# Patient Record
Sex: MALE | Race: WHITE | Employment: OTHER | URBAN - METROPOLITAN AREA
[De-identification: names, ages, dates, MRNs, and addresses within clinical notes are randomized per-mention and may not be internally consistent; named-entity substitution may affect disease eponyms.]

---

## 2017-02-27 PROCEDURE — 84403 ASSAY OF TOTAL TESTOSTERONE: CPT | Performed by: INTERNAL MEDICINE

## 2017-03-29 PROBLEM — E11.65 TYPE 2 DIABETES MELLITUS WITH HYPERGLYCEMIA, WITHOUT LONG-TERM CURRENT USE OF INSULIN (HCC): Status: ACTIVE | Noted: 2017-03-29

## 2017-03-29 PROBLEM — E11.3393 MODERATE NONPROLIFERATIVE DIABETIC RETINOPATHY OF BOTH EYES WITHOUT MACULAR EDEMA ASSOCIATED WITH TYPE 2 DIABETES MELLITUS (HCC): Status: ACTIVE | Noted: 2017-03-29

## 2017-07-25 PROBLEM — K21.9 CHRONIC GERD: Status: ACTIVE | Noted: 2017-07-25

## 2017-08-25 PROBLEM — H81.11 BPPV (BENIGN PAROXYSMAL POSITIONAL VERTIGO), RIGHT: Status: ACTIVE | Noted: 2017-08-25

## 2017-08-31 PROBLEM — H81.21 VESTIBULAR NEURONITIS OF RIGHT EAR: Status: ACTIVE | Noted: 2017-08-31

## 2018-01-25 PROCEDURE — 82043 UR ALBUMIN QUANTITATIVE: CPT | Performed by: INTERNAL MEDICINE

## 2018-01-25 PROCEDURE — 82570 ASSAY OF URINE CREATININE: CPT | Performed by: INTERNAL MEDICINE

## 2018-01-25 PROCEDURE — 36415 COLL VENOUS BLD VENIPUNCTURE: CPT | Performed by: INTERNAL MEDICINE

## 2018-05-10 PROCEDURE — 84146 ASSAY OF PROLACTIN: CPT | Performed by: INTERNAL MEDICINE

## 2018-05-10 PROCEDURE — 36415 COLL VENOUS BLD VENIPUNCTURE: CPT | Performed by: INTERNAL MEDICINE

## 2018-05-10 PROCEDURE — 83001 ASSAY OF GONADOTROPIN (FSH): CPT | Performed by: INTERNAL MEDICINE

## 2018-05-10 PROCEDURE — 84402 ASSAY OF FREE TESTOSTERONE: CPT | Performed by: INTERNAL MEDICINE

## 2018-05-10 PROCEDURE — 83002 ASSAY OF GONADOTROPIN (LH): CPT | Performed by: INTERNAL MEDICINE

## 2018-05-10 PROCEDURE — 84403 ASSAY OF TOTAL TESTOSTERONE: CPT | Performed by: INTERNAL MEDICINE

## 2018-05-16 PROCEDURE — 88305 TISSUE EXAM BY PATHOLOGIST: CPT | Performed by: INTERNAL MEDICINE

## 2019-06-10 PROCEDURE — 88305 TISSUE EXAM BY PATHOLOGIST: CPT | Performed by: INTERNAL MEDICINE

## 2019-11-07 PROBLEM — H40.51X3 NEOVASCULAR GLAUCOMA OF RIGHT EYE, SEVERE STAGE: Status: ACTIVE | Noted: 2019-11-07

## 2019-11-07 PROBLEM — H34.8111: Status: ACTIVE | Noted: 2019-11-07

## 2019-11-07 PROBLEM — E11.9 DIABETES MELLITUS TYPE 2 WITHOUT RETINOPATHY (HCC): Status: ACTIVE | Noted: 2019-11-07

## 2019-11-07 PROBLEM — Z96.1 PSEUDOPHAKIA: Status: ACTIVE | Noted: 2019-11-07

## 2019-11-07 PROBLEM — H25.12 NUCLEAR SCLEROTIC CATARACT OF LEFT EYE: Status: ACTIVE | Noted: 2019-11-07

## 2019-11-07 PROBLEM — H43.11 VITREOUS HEMORRHAGE OF RIGHT EYE (HCC): Status: ACTIVE | Noted: 2019-11-07

## 2019-11-07 PROBLEM — H44.521: Status: ACTIVE | Noted: 2019-11-07

## 2019-11-07 PROBLEM — H34.8111 CENTRAL RETINAL VEIN OCCLUSION WITH NEOVASCULARIZATION OF RIGHT EYE: Status: ACTIVE | Noted: 2019-11-07

## 2019-11-07 PROBLEM — H40.002 GLAUCOMA SUSPECT OF LEFT EYE: Status: ACTIVE | Noted: 2019-11-07

## 2020-09-22 ENCOUNTER — APPOINTMENT (OUTPATIENT)
Dept: LAB | Age: 55
End: 2020-09-22
Attending: OPHTHALMOLOGY
Payer: COMMERCIAL

## 2020-09-22 DIAGNOSIS — Z01.818 PRE-PROCEDURAL EXAMINATION: ICD-10-CM

## 2020-09-23 LAB — SARS-COV-2 RNA RESP QL NAA+PROBE: NOT DETECTED

## 2020-09-25 ENCOUNTER — LAB REQUISITION (OUTPATIENT)
Dept: LAB | Facility: HOSPITAL | Age: 55
End: 2020-09-25
Payer: COMMERCIAL

## 2020-09-25 DIAGNOSIS — D31.91 BENIGN NEOPLASM OF UNSPECIFIED PART OF RIGHT EYE: ICD-10-CM

## 2020-09-25 PROCEDURE — 88305 TISSUE EXAM BY PATHOLOGIST: CPT | Performed by: OPHTHALMOLOGY

## 2021-12-02 PROBLEM — S83.231A COMPLEX TEAR OF MEDIAL MENISCUS OF RIGHT KNEE AS CURRENT INJURY: Status: ACTIVE | Noted: 2021-12-02

## 2021-12-02 PROBLEM — M17.11 PRIMARY OSTEOARTHRITIS OF RIGHT KNEE: Status: ACTIVE | Noted: 2021-12-02

## 2022-01-31 PROBLEM — Z47.89 ORTHOPEDIC AFTERCARE: Status: ACTIVE | Noted: 2022-01-31

## 2022-10-15 ENCOUNTER — LAB ENCOUNTER (OUTPATIENT)
Dept: LAB | Age: 57
End: 2022-10-15
Attending: INTERNAL MEDICINE
Payer: COMMERCIAL

## 2022-10-15 DIAGNOSIS — Z01.818 PREOP TESTING: ICD-10-CM

## 2022-10-15 LAB — SARS-COV-2 RNA RESP QL NAA+PROBE: NOT DETECTED

## 2022-10-16 ENCOUNTER — ANESTHESIA EVENT (OUTPATIENT)
Dept: ENDOSCOPY | Facility: HOSPITAL | Age: 57
End: 2022-10-16
Payer: COMMERCIAL

## 2022-10-17 ENCOUNTER — ANESTHESIA (OUTPATIENT)
Dept: ENDOSCOPY | Facility: HOSPITAL | Age: 57
End: 2022-10-17
Payer: COMMERCIAL

## 2022-10-17 ENCOUNTER — HOSPITAL ENCOUNTER (OUTPATIENT)
Facility: HOSPITAL | Age: 57
Setting detail: HOSPITAL OUTPATIENT SURGERY
Discharge: HOME | End: 2022-10-17
Attending: INTERNAL MEDICINE | Admitting: INTERNAL MEDICINE
Payer: COMMERCIAL

## 2022-10-17 VITALS
BODY MASS INDEX: 37.51 KG/M2 | WEIGHT: 239 LBS | SYSTOLIC BLOOD PRESSURE: 140 MMHG | OXYGEN SATURATION: 92 % | HEART RATE: 67 BPM | RESPIRATION RATE: 18 BRPM | HEIGHT: 67 IN | DIASTOLIC BLOOD PRESSURE: 78 MMHG | TEMPERATURE: 98 F

## 2022-10-17 DIAGNOSIS — Z01.818 PREOP TESTING: Primary | ICD-10-CM

## 2022-10-17 LAB — GLUCOSE BLD-MCNC: 163 MG/DL (ref 70–99)

## 2022-10-17 PROCEDURE — 82962 GLUCOSE BLOOD TEST: CPT

## 2022-10-17 PROCEDURE — 0D538ZZ DESTRUCTION OF LOWER ESOPHAGUS, VIA NATURAL OR ARTIFICIAL OPENING ENDOSCOPIC: ICD-10-PCS | Performed by: INTERNAL MEDICINE

## 2022-10-17 RX ORDER — DEXTROSE MONOHYDRATE 25 G/50ML
50 INJECTION, SOLUTION INTRAVENOUS
Status: DISCONTINUED | OUTPATIENT
Start: 2022-10-17 | End: 2022-10-17

## 2022-10-17 RX ORDER — LIDOCAINE HYDROCHLORIDE 10 MG/ML
INJECTION, SOLUTION EPIDURAL; INFILTRATION; INTRACAUDAL; PERINEURAL AS NEEDED
Status: DISCONTINUED | OUTPATIENT
Start: 2022-10-17 | End: 2022-10-17 | Stop reason: SURG

## 2022-10-17 RX ORDER — NICOTINE POLACRILEX 4 MG
15 LOZENGE BUCCAL
Status: DISCONTINUED | OUTPATIENT
Start: 2022-10-17 | End: 2022-10-17

## 2022-10-17 RX ORDER — SODIUM CHLORIDE, SODIUM LACTATE, POTASSIUM CHLORIDE, CALCIUM CHLORIDE 600; 310; 30; 20 MG/100ML; MG/100ML; MG/100ML; MG/100ML
INJECTION, SOLUTION INTRAVENOUS CONTINUOUS
Status: DISCONTINUED | OUTPATIENT
Start: 2022-10-17 | End: 2022-10-17

## 2022-10-17 RX ORDER — NICOTINE POLACRILEX 4 MG
30 LOZENGE BUCCAL
Status: DISCONTINUED | OUTPATIENT
Start: 2022-10-17 | End: 2022-10-17

## 2022-10-17 RX ORDER — NALOXONE HYDROCHLORIDE 0.4 MG/ML
80 INJECTION, SOLUTION INTRAMUSCULAR; INTRAVENOUS; SUBCUTANEOUS AS NEEDED
Status: DISCONTINUED | OUTPATIENT
Start: 2022-10-17 | End: 2022-10-17

## 2022-10-17 RX ADMIN — SODIUM CHLORIDE, SODIUM LACTATE, POTASSIUM CHLORIDE, CALCIUM CHLORIDE: 600; 310; 30; 20 INJECTION, SOLUTION INTRAVENOUS at 14:37:00

## 2022-10-17 RX ADMIN — LIDOCAINE HYDROCHLORIDE 50 MG: 10 INJECTION, SOLUTION EPIDURAL; INFILTRATION; INTRACAUDAL; PERINEURAL at 14:11:00

## 2022-10-17 NOTE — ANESTHESIA POSTPROCEDURE EVALUATION
[address] Patient Status:  Hospital Outpatient Surgery   Age/Gender 59year old male MRN [id number]   Location [address] [address] Attending Kameron Guerin MD   Hosp Day # 0 PCP Martin Feliciano MD       Anesthesia Post-op Note    ESOPHAGOGASTRODUODENOSCOPY WITH RADIOFREQUENCY ABLATION     Procedure Summary     Date: 10/17/22 Room / Location: [facility] ENDOSCOPY 02 / [facility] ENDOSCOPY    Anesthesia Start: 0687 Anesthesia Stop: 0594    Procedure: ESOPHAGOGASTRODUODENOSCOPY WITH RADIOFREQUENCY ABLATION (N/A ) Diagnosis: (Barretts and sliding hiatal hernia)    Surgeons: Kameron Guerin MD Anesthesiologist: Sanket Stewart DO    Anesthesia Type: MAC ASA Status: 3          Anesthesia Type: MAC    Vitals Value Taken Time   /79 10/17/22 1450   Temp  10/17/22 1454   Pulse 67 10/17/22 1453   Resp 18 10/17/22 1435   SpO2 92 % 10/17/22 1453   Vitals shown include unvalidated device data. Patient Location: PACU    Anesthesia Type: MAC    Airway Patency: patent    Postop Pain Control: adequate    Mental Status: preanesthetic baseline    Nausea/Vomiting: none    Cardiopulmonary/Hydration status: stable euvolemic    Complications: no apparent anesthesia related complications    Postop vital signs: stable    Dental Exam: Unchanged from Preop    Patient to be discharged from PACU when criteria met.

## 2022-10-17 NOTE — OPERATIVE REPORT
OPERATIVE REPORT   PATIENT NAME: Kelli Begum  MRN: [id number]  DATE OF OPERATION: 10/17/2022  PREOPERATIVE DIAGNOSIS:   1. Sanders's esophagus with high-grade dysplasia  POSTOPERATIVE DIAGNOSES:  1. Sanders's esophagus without obvious suspicious areas status post RFA  PROCEDURE PERFORMED: Upper endoscopy with with radiofrequency ablation  SURGEON: Olga Lidia Tong MD   MEDICATIONS: None    ANESTHESIA: MAC  CONSENT: The potential risks including but not limited to perforation, bleeding, infection, medication, chest pain, stricture formation requiring the scopic treatment, or complication leading up to prolonged hospital stay or needing surgical discussed with the patient in details. He was given opportunity to ask questions and all his questions were answered. Alternatives and options sherry with him he signed informed this consent. SPECIMEN: None  COMPLICATIONS: None immediately apparent  PROCEDURE AND FINDINGS:     After achieving adequate sedation and placing the patient in the left lateral decubitus position the Olympus upper scope was introduced under direct visualization in the esophagus after placement of the distal end clear detachable cap onto the tip of the scope. There were tongues of columnar epithelium seen in the distal part of the esophagus. No obvious suspicious areas seen. It extended from approximately 36 cm from the incisors down to 39 cm from the incisors M3C0. Sliding hiatal hernia, Hill grade 2 was seen. The stem examination was unremarkable. The bulb and second portion of the duodenum appeared unremarkable. The scope at this point was removed after suction the gastric content and the [employer] 90 HALO focal RFA device was connected to the tip of the scope and the scope was introduced under direct visualization in the esophagus. The Sandesr's segment was treated. Coagulum was scraped off and second treatment was applied. The scope at this point was removed.   No obvious bleeding or perforation seen. IMPRESSION:  1. Findings discussed above  RECOMMENDATIONS:  1. Clear liquid diet for 24 hours then soft for 5 days then as tolerated. 2. Continue with PPI twice daily. 3. Carafate 1 g 4 times a day for 14 days. 4. Tylenol 3 and or viscous lidocaine as needed for pain. 5. Repeat upper endoscopy with possible radiofrequency ablation of any residual Sanders's in 2 months.   Susan Robbins MD

## 2023-01-20 ENCOUNTER — LAB ENCOUNTER (OUTPATIENT)
Dept: LAB | Age: 58
End: 2023-01-20
Attending: INTERNAL MEDICINE
Payer: COMMERCIAL

## 2023-01-20 DIAGNOSIS — Z01.818 PRE-OP TESTING: ICD-10-CM

## 2023-01-20 LAB — SARS-COV-2 RNA RESP QL NAA+PROBE: NOT DETECTED

## 2023-01-23 ENCOUNTER — ANESTHESIA EVENT (OUTPATIENT)
Dept: ENDOSCOPY | Facility: HOSPITAL | Age: 58
End: 2023-01-23
Payer: COMMERCIAL

## 2023-01-23 ENCOUNTER — ANESTHESIA (OUTPATIENT)
Dept: ENDOSCOPY | Facility: HOSPITAL | Age: 58
End: 2023-01-23
Payer: COMMERCIAL

## 2023-01-23 ENCOUNTER — HOSPITAL ENCOUNTER (OUTPATIENT)
Facility: HOSPITAL | Age: 58
Setting detail: HOSPITAL OUTPATIENT SURGERY
Discharge: HOME | End: 2023-01-23
Attending: INTERNAL MEDICINE | Admitting: INTERNAL MEDICINE
Payer: COMMERCIAL

## 2023-01-23 VITALS
HEIGHT: 67 IN | OXYGEN SATURATION: 97 % | TEMPERATURE: 98 F | WEIGHT: 234 LBS | HEART RATE: 68 BPM | BODY MASS INDEX: 36.73 KG/M2 | DIASTOLIC BLOOD PRESSURE: 77 MMHG | RESPIRATION RATE: 16 BRPM | SYSTOLIC BLOOD PRESSURE: 126 MMHG

## 2023-01-23 DIAGNOSIS — Z01.818 PRE-OP TESTING: Primary | ICD-10-CM

## 2023-01-23 LAB — GLUCOSE BLD-MCNC: 167 MG/DL (ref 70–99)

## 2023-01-23 PROCEDURE — 82962 GLUCOSE BLOOD TEST: CPT

## 2023-01-23 PROCEDURE — 88307 TISSUE EXAM BY PATHOLOGIST: CPT | Performed by: INTERNAL MEDICINE

## 2023-01-23 PROCEDURE — 88305 TISSUE EXAM BY PATHOLOGIST: CPT | Performed by: INTERNAL MEDICINE

## 2023-01-23 PROCEDURE — 0DB38ZX EXCISION OF LOWER ESOPHAGUS, VIA NATURAL OR ARTIFICIAL OPENING ENDOSCOPIC, DIAGNOSTIC: ICD-10-PCS | Performed by: INTERNAL MEDICINE

## 2023-01-23 RX ORDER — ONDANSETRON 2 MG/ML
4 INJECTION INTRAMUSCULAR; INTRAVENOUS EVERY 6 HOURS PRN
Status: DISCONTINUED | OUTPATIENT
Start: 2023-01-23 | End: 2023-01-23

## 2023-01-23 RX ORDER — MULTIVIT WITH MINERALS/LUTEIN
1000 TABLET ORAL DAILY
COMMUNITY

## 2023-01-23 RX ORDER — PROCHLORPERAZINE EDISYLATE 5 MG/ML
5 INJECTION INTRAMUSCULAR; INTRAVENOUS EVERY 8 HOURS PRN
Status: DISCONTINUED | OUTPATIENT
Start: 2023-01-23 | End: 2023-01-23

## 2023-01-23 RX ORDER — NICOTINE POLACRILEX 4 MG
15 LOZENGE BUCCAL
Status: DISCONTINUED | OUTPATIENT
Start: 2023-01-23 | End: 2023-01-23 | Stop reason: HOSPADM

## 2023-01-23 RX ORDER — NICOTINE POLACRILEX 4 MG
30 LOZENGE BUCCAL
Status: DISCONTINUED | OUTPATIENT
Start: 2023-01-23 | End: 2023-01-23 | Stop reason: HOSPADM

## 2023-01-23 RX ORDER — SODIUM CHLORIDE, SODIUM LACTATE, POTASSIUM CHLORIDE, CALCIUM CHLORIDE 600; 310; 30; 20 MG/100ML; MG/100ML; MG/100ML; MG/100ML
INJECTION, SOLUTION INTRAVENOUS CONTINUOUS
Status: DISCONTINUED | OUTPATIENT
Start: 2023-01-23 | End: 2023-01-23

## 2023-01-23 RX ORDER — DEXTROSE MONOHYDRATE 25 G/50ML
50 INJECTION, SOLUTION INTRAVENOUS
Status: DISCONTINUED | OUTPATIENT
Start: 2023-01-23 | End: 2023-01-23 | Stop reason: HOSPADM

## 2023-01-23 RX ORDER — NALOXONE HYDROCHLORIDE 0.4 MG/ML
80 INJECTION, SOLUTION INTRAMUSCULAR; INTRAVENOUS; SUBCUTANEOUS AS NEEDED
Status: DISCONTINUED | OUTPATIENT
Start: 2023-01-23 | End: 2023-01-23

## 2023-01-23 NOTE — DISCHARGE INSTRUCTIONS
Clear liquid diet for today then advance to soft then as tolerated. Avoid NSAIDs for 7 days. Home Care Instructions for Gastroscopy with Sedation    Diet:  - Resume your regular diet as tolerated unless otherwise instructed. - Start with light meals to minimize bloating.  - Do not drink alcohol today. Medication:  - If you have questions about resuming your normal medications, please contact your Primary Care Physician. Activities:  - Take it easy today. Do not return to work today. - Do not drive today. - Do not operate any machinery today (including kitchen equipment). Gastroscopy:  - You may have a sore throat for 2-3 days following the exam. This is normal. Gargling with warm salt water (1/2 tsp salt to 1 glass warm water) or using throat lozenges will help. - If you experience any sharp pain in your neck, abdomen or chest, vomiting of blood, oral temperature over 100 degrees Fahrenheit, light-headedness or dizziness, or any other problems, contact your doctor. **If unable to reach your doctor, please go to the [facility] Emergency Room**    - Your referring physician will receive a full report of your examination.  - If you do not hear from your doctor's office within two weeks of your biopsy, please call them for your results.

## 2023-01-23 NOTE — ANESTHESIA POSTPROCEDURE EVALUATION
[address] Patient Status:  Hospital Outpatient Surgery   Age/Gender 59year old male MRN [id number]   Location [address] [address] Attending Ar Love MD   Hosp Day # 0 PCP Gege Diallo MD       Anesthesia Post-op Note    ESOPHAGOGASTRODUODENOSCOPY WITH ENDOSCOPIC MUCOSAL RESECTION AND GOLD PROBE CAUTERIZATION    Procedure Summary     Date: 01/23/23 Room / Location: [address] [place] [address] ENDOSCOPY 02 / [facility] ENDOSCOPY    Anesthesia Start: 5897 Anesthesia Stop: 1000    Procedure: ESOPHAGOGASTRODUODENOSCOPY WITH ENDOSCOPIC MUCOSAL RESECTION AND GOLD PROBE CAUTERIZATION Diagnosis: (Sanders's Esphagus, Hiatal Hernia)    Surgeons: Ar Love MD Anesthesiologist: Kyara Stearns MD    Anesthesia Type: MAC ASA Status: 3          Anesthesia Type: MAC    Vitals Value Taken Time   /82 1   Temp na 01/23/23 1000   Pulse 65 01/23/23 1000   Resp 15 01/23/23 1000   SpO2 100 01/23/23 1000       Patient Location: Endoscopy    Anesthesia Type: MAC    Airway Patency: patent    Postop Pain Control: adequate    Mental Status: preanesthetic baseline    Nausea/Vomiting: none    Cardiopulmonary/Hydration status: stable euvolemic    Complications: no apparent anesthesia related complications    Postop vital signs: stable    Dental Exam: Unchanged from Preop    Patient to be discharged home when criteria met.

## 2024-10-07 ENCOUNTER — ANESTHESIA (OUTPATIENT)
Dept: ENDOSCOPY | Facility: HOSPITAL | Age: 59
End: 2024-10-07
Payer: COMMERCIAL

## 2024-10-07 ENCOUNTER — ANESTHESIA EVENT (OUTPATIENT)
Dept: ENDOSCOPY | Facility: HOSPITAL | Age: 59
End: 2024-10-07
Payer: COMMERCIAL

## 2024-10-07 ENCOUNTER — HOSPITAL ENCOUNTER (OUTPATIENT)
Facility: HOSPITAL | Age: 59
Setting detail: HOSPITAL OUTPATIENT SURGERY
Discharge: HOME | End: 2024-10-07
Attending: INTERNAL MEDICINE | Admitting: INTERNAL MEDICINE
Payer: COMMERCIAL

## 2024-10-07 VITALS
TEMPERATURE: 99 F | HEIGHT: 67 IN | DIASTOLIC BLOOD PRESSURE: 70 MMHG | WEIGHT: 235 LBS | BODY MASS INDEX: 36.88 KG/M2 | RESPIRATION RATE: 20 BRPM | HEART RATE: 63 BPM | OXYGEN SATURATION: 97 % | SYSTOLIC BLOOD PRESSURE: 130 MMHG

## 2024-10-07 LAB — GLUCOSE BLD-MCNC: 134 MG/DL (ref 70–99)

## 2024-10-07 PROCEDURE — 0D598ZZ DESTRUCTION OF DUODENUM, VIA NATURAL OR ARTIFICIAL OPENING ENDOSCOPIC: ICD-10-PCS | Performed by: INTERNAL MEDICINE

## 2024-10-07 PROCEDURE — 82962 GLUCOSE BLOOD TEST: CPT

## 2024-10-07 RX ORDER — NALOXONE HYDROCHLORIDE 0.4 MG/ML
0.08 INJECTION, SOLUTION INTRAMUSCULAR; INTRAVENOUS; SUBCUTANEOUS ONCE AS NEEDED
Status: DISCONTINUED | OUTPATIENT
Start: 2024-10-07 | End: 2024-10-07

## 2024-10-07 RX ORDER — SODIUM CHLORIDE, SODIUM LACTATE, POTASSIUM CHLORIDE, CALCIUM CHLORIDE 600; 310; 30; 20 MG/100ML; MG/100ML; MG/100ML; MG/100ML
INJECTION, SOLUTION INTRAVENOUS CONTINUOUS
Status: DISCONTINUED | OUTPATIENT
Start: 2024-10-07 | End: 2024-10-07

## 2024-10-07 RX ORDER — LIDOCAINE HYDROCHLORIDE 10 MG/ML
INJECTION, SOLUTION EPIDURAL; INFILTRATION; INTRACAUDAL; PERINEURAL AS NEEDED
Status: DISCONTINUED | OUTPATIENT
Start: 2024-10-07 | End: 2024-10-07 | Stop reason: SURG

## 2024-10-07 RX ORDER — DEXTROSE MONOHYDRATE 25 G/50ML
50 INJECTION, SOLUTION INTRAVENOUS
Status: DISCONTINUED | OUTPATIENT
Start: 2024-10-07 | End: 2024-10-07

## 2024-10-07 RX ORDER — NICOTINE POLACRILEX 4 MG
30 LOZENGE BUCCAL
Status: DISCONTINUED | OUTPATIENT
Start: 2024-10-07 | End: 2024-10-07

## 2024-10-07 RX ORDER — NICOTINE POLACRILEX 4 MG
15 LOZENGE BUCCAL
Status: DISCONTINUED | OUTPATIENT
Start: 2024-10-07 | End: 2024-10-07

## 2024-10-07 RX ADMIN — SODIUM CHLORIDE, SODIUM LACTATE, POTASSIUM CHLORIDE, CALCIUM CHLORIDE: 600; 310; 30; 20 INJECTION, SOLUTION INTRAVENOUS at 08:28:00

## 2024-10-07 RX ADMIN — LIDOCAINE HYDROCHLORIDE 5 ML: 10 INJECTION, SOLUTION EPIDURAL; INFILTRATION; INTRACAUDAL; PERINEURAL at 08:31:00

## 2024-10-07 RX ADMIN — SODIUM CHLORIDE, SODIUM LACTATE, POTASSIUM CHLORIDE, CALCIUM CHLORIDE: 600; 310; 30; 20 INJECTION, SOLUTION INTRAVENOUS at 08:44:00

## 2024-10-07 NOTE — H&P
History & Physical Examination    Patient Name: Nacho Osorio  MRN: [id number]  CSN: [id number]  YOB: 1965    Diagnosis: SANDERS'S ESOPHAGUS WITHOUT DYSPLASIA; SANDERS'S ESOPHAGUS WITH HIGH GRADE DYSPLASIA      Present Illness:  Nacho Osorio is a 59 year old male is here SANDERS'S ESOPHAGUS WITHOUT DYSPLASIA; SANDERS'S ESOPHAGUS WITH HIGH GRADE DYSPLASIA.    Body mass index is 36.81 kg/m².    Past Medical History:    Sanders's esophagus    2019    Blindness of right eye    Calculus of kidney    COVID    Cold-like symptoms; congestions, runny nose, chills, fever. cough x 3-4 weeks. Not hospitalized. Symptoms resolved    Esophageal reflux    Eye globe prosthesis    right    Glaucoma    High blood pressure    High cholesterol    Hx of motion sickness    Morbid obesity due to excess calories (HCC)    Other and unspecified hyperlipidemia    Type II or unspecified type diabetes mellitus without mention of complication, not stated as uncontrolled    Visual impairment    glasses       Procedure: EGD with ablation    Physician Pre-Sedation Assessment    Pre-Sedation Assessment:    Sedation History: Airway Assessed    ASA Classification: 2. Patient with mild systemic disease    Cardiac:    Respiratory:    Abdomen:      Plan: MAC        Current Facility-Administered Medications   Medication Dose Route Frequency    glucose (Dex4) 15 GM/59ML oral liquid 15 g  15 g Oral Q15 Min PRN    Or    glucose (Glutose) 40% oral gel 15 g  15 g Oral Q15 Min PRN    Or    glucose-vitamin C (Dex-4) chewable tab 4 tablet  4 tablet Oral Q15 Min PRN    Or    dextrose 50% injection 50 mL  50 mL Intravenous Q15 Min PRN    Or    glucose (Dex4) 15 GM/59ML oral liquid 30 g  30 g Oral Q15 Min PRN    Or    glucose (Glutose) 40% oral gel 30 g  30 g Oral Q15 Min PRN    Or    glucose-vitamin C (Dex-4) chewable tab 8 tablet  8 tablet Oral Q15 Min PRN    lactated ringers infusion   Intravenous Continuous       Allergies: No Known  Allergies    Past Surgical History:   Procedure Laterality Date    Colonoscopy  2015    limited prep. hemorrhoids. repeat , 2 day prep    Colonoscopy  2018    polyp (non adenoma). repeat 5  yrs, 2 day prep    Colonoscopy,biopsy N/A 2015    Procedure: COLONOSCOPY, POSSIBLE BIOPSY, POSSIBLE POLYPECTOMY 55067;  Surgeon: Kael Whelan MD;  Location: [facility], [facility]    Knee scope,med/lat menisectomy  2012    Procedure: ARTHROSCOPY KNEE;  Surgeon: Alexander Chapin MD;  Location: [facility], [facility]    Knee scope,remv loose body  2012    Procedure: ARTHROSCOPY KNEE;  Surgeon: Alexander Chapin MD;  Location: [facility], [facility]    Meniscectomy Right 2022    Other Right 2018    rigth eye surgery     Other surgical history      nasal surgery.     Upper gi endoscopy,exam       Family History   Problem Relation Age of Onset    Colon Cancer Mother         60's age, esophageal dilation    Stroke Father         before he      Schizophrenia Father     Other (esophageal cancer) Brother     Heart Attack Neg     Breast Cancer Neg      Social History     Tobacco Use    Smoking status: Never    Smokeless tobacco: Never   Substance Use Topics    Alcohol use: Not Currently     Comment: occasional       SYSTEM Check if Review is Normal Check if Physical Exam is Normal If not normal, please explain:   HEENT [x ] [x ]    NECK & BACK [x ] [x ]    HEART [x ] [x ]    LUNGS [x ] [x ]    ABDOMEN [x ] [x ]    UROGENITAL [ ] [ ]    EXTREMITIES [ ] [ ]    OTHER        [ x ] I have discussed the risks and benefits and alternatives with the patient/family.  They understand and agree to proceed with plan of care.  [ x ] I have reviewed the History and Physical done within the last 30 days.  Any changes noted above.    Manuel Adames MD  10/7/2024  8:22 AM

## 2024-10-07 NOTE — ANESTHESIA POSTPROCEDURE EVALUATION
[facility]    Nacho Osorio Patient Status:  Hospital Outpatient Surgery   Age/Gender 59 year old male MRN [id number]   Location [facility] ENDOSCOPY PAIN CENTER Attending Manuel Adames MD   Hosp Day # 0 PCP Lizz Bob MD       Anesthesia Post-op Note    ESOPHAGOGASTRODUODENOSCOPY (EGD) WITH CHANNEL RADIO FREQUENCY ABLATION    Procedure Summary       Date: 10/07/24 Room / Location:  ENDOSCOPY 03 /  ENDOSCOPY    Anesthesia Start: 0828 Anesthesia Stop: 0847    Procedure: ESOPHAGOGASTRODUODENOSCOPY (EGD) WITH CHANNEL RADIO FREQUENCY ABLATION Diagnosis: (ROPER'S)    Surgeons: Manuel Adames MD Anesthesiologist: Rusty Reis MD    Anesthesia Type: MAC ASA Status: 3            Anesthesia Type: MAC    Vitals Value Taken Time   /80 10/07/24 0902   Temp  10/07/24 0906   Pulse 63 10/07/24 0906   Resp  10/07/24 0906   SpO2 100 % 10/07/24 0906   Vitals shown include unfiled device data.    Patient Location: PACU    Anesthesia Type: MAC    Airway Patency: patent    Postop Pain Control: adequate    Mental Status: preanesthetic baseline    Nausea/Vomiting: none    Cardiopulmonary/Hydration status: stable euvolemic    Complications: no apparent anesthesia related complications    Postop vital signs: stable    Dental Exam: Unchanged from Preop    Patient to be discharged home when criteria met.

## 2024-10-07 NOTE — ANESTHESIA PREPROCEDURE EVALUATION
PRE-OP EVALUATION    Patient Name: Nacho Osorio    Admit Diagnosis: ROPER'S ESOPHAGUS WITHOUT DYSPLASIA; ROPER'S ESOPHAGUS WITH HIGH GRADE DYSPLASIA    Pre-op Diagnosis: ROPER'S ESOPHAGUS WITHOUT DYSPLASIA; ROPER'S ESOPHAGUS WITH HIGH GRADE DYSPLASIA    ESOPHAGOGASTRODUODENOSCOPY (EGD) WITH CHANNEL RADIO FREQUENCY ABLATION    Anesthesia Procedure: ESOPHAGOGASTRODUODENOSCOPY (EGD) WITH CHANNEL RADIO FREQUENCY ABLATION    Surgeons and Role:     * Manuel Adames MD - Primary    Pre-op vitals reviewed.        Body mass index is 36.81 kg/m².    Current medications reviewed.  Hospital Medications:  • glucose (Dex4) 15 GM/59ML oral liquid 15 g  15 g Oral Q15 Min PRN    Or   • glucose (Glutose) 40% oral gel 15 g  15 g Oral Q15 Min PRN    Or   • glucose-vitamin C (Dex-4) chewable tab 4 tablet  4 tablet Oral Q15 Min PRN    Or   • dextrose 50% injection 50 mL  50 mL Intravenous Q15 Min PRN    Or   • glucose (Dex4) 15 GM/59ML oral liquid 30 g  30 g Oral Q15 Min PRN    Or   • glucose (Glutose) 40% oral gel 30 g  30 g Oral Q15 Min PRN    Or   • glucose-vitamin C (Dex-4) chewable tab 8 tablet  8 tablet Oral Q15 Min PRN   • lactated ringers infusion   Intravenous Continuous       Outpatient Medications:     Medications Prior to Admission   Medication Sig Dispense Refill Last Dose   • semaglutide 4 MG/3ML Subcutaneous Solution Pen-injector Inject 1 mg into the skin once a week. Every Monday      • Ascorbic Acid (VITAMIN C) 1000 MG Oral Tab Take 1 tablet (1,000 mg total) by mouth daily.      • CINNAMON OR Take by mouth 2 (two) times a day.      • FARXIGA 10 MG Oral Tab TAKE 1 TABLET(10 MG) BY MOUTH DAILY 90 tablet 0    • OMEPRAZOLE 20 MG Oral Capsule Delayed Release TAKE ONE CAPSULE BY MOUTH DAILY AS NEEDED (Patient taking differently: Take 2 capsules (40 mg total) by mouth every morning.) 90 capsule 0    • ATORVASTATIN 40 MG Oral Tab TAKE 1 TABLET(40 MG) BY MOUTH DAILY 90 tablet 1    • METFORMIN HCL 1000 MG  Oral Tab TAKE 1 TABLET(1000 MG) BY MOUTH TWICE DAILY WITH MEALS 180 tablet 1    • glimepiride 2 MG Oral Tab Take 1 tablet (2 mg total) by mouth 2 (two) times a day. 180 tablet 1    • lisinopril 40 MG Oral Tab Take 1 tablet (40 mg total) by mouth once daily. 90 tablet 3    • ZINC OR Take by mouth daily.      • omega-3 fatty acids (FISH OIL) 1000 MG Oral Cap Take 1,000 mg by mouth 2 (two) times daily.      • Glucose Blood (ONETOUCH ULTRA) In Vitro Strip  each 1    • OneTouch Delica Lancets 33G Does not apply Misc Use to test 2x daily 300 each 1        Allergies: Patient has no known allergies.      Anesthesia Evaluation    Patient summary reviewed.    Anesthetic Complications  (-) history of anesthetic complications         GI/Hepatic/Renal      (+) GERD                           Cardiovascular      ECG reviewed.  Exercise tolerance: good     MET: >4    (+) obesity  (+) hypertension   (+) hyperlipidemia                                  Endo/Other      (+) diabetes  type 2,                          Pulmonary                           Neuro/Psych                 (+) neuromuscular disease                 Past Surgical History:   Procedure Laterality Date   • Colonoscopy  08/2015    limited prep. hemorrhoids. repeat 2018, 2 day prep   • Colonoscopy  05/2018    polyp (non adenoma). repeat 5  yrs, 2 day prep   • Colonoscopy,biopsy N/A 08/18/2015    Procedure: COLONOSCOPY, POSSIBLE BIOPSY, POSSIBLE POLYPECTOMY 19022;  Surgeon: Kael Whelan MD;  Location: [facility]   • Knee scope,med/lat menisectomy  03/20/2012    Procedure: ARTHROSCOPY KNEE;  Surgeon: Alexander Chapin MD;  Location: [facility]   • Knee scope,remv loose body  03/20/2012    Procedure: ARTHROSCOPY KNEE;  Surgeon: Alexander Chapin MD;  Location: [facility]   • Meniscectomy Right 04/2022   • Other Right 03/07/2018    [facility] eye surgery    • Other surgical history      nasal surgery.    • Upper gi endoscopy,exam        Social History     Socioeconomic History   • Marital status:    Tobacco Use   • Smoking status: Never   • Smokeless tobacco: Never   Vaping Use   • Vaping status: Never Used   Substance and Sexual Activity   • Alcohol use: Not Currently     Comment: occasional   • Drug use: No     History   Drug Use No     Available pre-op labs reviewed.               Airway      Mallampati: II  Mouth opening: >3 FB  TM distance: > 6 cm   Cardiovascular    Cardiovascular exam normal.         Dental             Pulmonary    Pulmonary exam normal.                 Other findings        ASA: 3   Plan: MAC  NPO status verified and patient meets guidelines.          Plan/risks discussed with: patient            Present on Admission:  **None**

## 2024-10-07 NOTE — OPERATIVE REPORT
OPERATIVE REPORT   PATIENT NAME: Nacho Osorio  MRN: [id number]  DATE OF OPERATION: 10/7/2024  PREOPERATIVE DIAGNOSIS: Shanks's esophagus with high grade dysplasia, Shanks's esophagus without dysplasia; s/p RFA and EMR in remote past and now with recurrent non-dysplastic Shanks's s/p RFA with me #1/3; here for repeat ablation.  POSTOPERATIVE DIAGNOSIS:    1.  Irregular Z line; 2 small tongues of smooth shanks's   PROCEDURE PERFORMED: Esophagogastroduodenoscopy with radiofrequency ablation  SEDATION MEDICATIONS: MAC  MODERATE SEDATION TIME: None.  Deep sedation provided by anesthesia  PREPROCEDURE ASSESSMENT: The indication for this procedure is to assess for radiofrequency ablation. The patient was identified by myself and nursing staff in the exam room. Informed consent was obtained. The patient was seen in clinic and a full H&P was obtained. On brief physical examination, airway is patent. Chest is clear. Heart has regular rate and rhythm. Abdomen is soft, nontender with good bowel sounds. A medication list was taken by nursing today and reviewed by myself. The patient is an ASA grade 2.   PROCEDURE NOTE: The procedure was completed without difficulty. The patient tolerated the procedure well. The endoscope was inserted through the mouth and advanced to the level of the duodenum, 3rd portion.  Esophagus appeared normal without stricture or esophagitis.  Irregular Z-line was noted.  2 small tongues of possible Shanks's was noted less than 1 cm in length.  Circumferential ablation was performed using the channel RFA catheter.  This was done twice and in between the coagulum was scraped off using the attached cap. No hiatal hernia or Shanks's esophagus was noted.  Stomach was normal in the antrum and the body.  Duodenum was normal in the bulb and 2nd duodenum.  Retroflexed view in the stomach revealed normal fundus and cardia. There were no immediate complications.   FINDINGS   Successful ablation #2/3  of Sanders's  RECOMMENDATIONS: Repeat oee more time in 2 months and then endoscopic biopsies in 6 months  DISCHARGE:  On discharge, the patient was given an after-visit summary detailing the procedure, findings, followup plans, and an updated medication list.     Thank you very much for the consultation.  I really appreciate it.    Manuel Adames MD

## 2024-10-07 NOTE — DISCHARGE INSTRUCTIONS

## 2024-12-09 ENCOUNTER — ANESTHESIA (OUTPATIENT)
Dept: ENDOSCOPY | Facility: HOSPITAL | Age: 59
End: 2024-12-09
Payer: COMMERCIAL

## 2024-12-09 ENCOUNTER — HOSPITAL ENCOUNTER (OUTPATIENT)
Facility: HOSPITAL | Age: 59
Setting detail: HOSPITAL OUTPATIENT SURGERY
Discharge: HOME | End: 2024-12-09
Attending: INTERNAL MEDICINE | Admitting: INTERNAL MEDICINE
Payer: COMMERCIAL

## 2024-12-09 ENCOUNTER — ANESTHESIA EVENT (OUTPATIENT)
Dept: ENDOSCOPY | Facility: HOSPITAL | Age: 59
End: 2024-12-09
Payer: COMMERCIAL

## 2024-12-09 VITALS
DIASTOLIC BLOOD PRESSURE: 90 MMHG | TEMPERATURE: 98 F | SYSTOLIC BLOOD PRESSURE: 141 MMHG | HEART RATE: 80 BPM | OXYGEN SATURATION: 95 % | HEIGHT: 67 IN | BODY MASS INDEX: 35.31 KG/M2 | RESPIRATION RATE: 18 BRPM | WEIGHT: 225 LBS

## 2024-12-09 LAB — GLUCOSE BLD-MCNC: 153 MG/DL (ref 70–99)

## 2024-12-09 PROCEDURE — 0D538ZZ DESTRUCTION OF LOWER ESOPHAGUS, VIA NATURAL OR ARTIFICIAL OPENING ENDOSCOPIC: ICD-10-PCS | Performed by: INTERNAL MEDICINE

## 2024-12-09 PROCEDURE — 82962 GLUCOSE BLOOD TEST: CPT

## 2024-12-09 RX ORDER — SODIUM CHLORIDE, SODIUM LACTATE, POTASSIUM CHLORIDE, CALCIUM CHLORIDE 600; 310; 30; 20 MG/100ML; MG/100ML; MG/100ML; MG/100ML
INJECTION, SOLUTION INTRAVENOUS CONTINUOUS
Status: DISCONTINUED | OUTPATIENT
Start: 2024-12-09 | End: 2024-12-09

## 2024-12-09 RX ORDER — NICOTINE POLACRILEX 4 MG
15 LOZENGE BUCCAL
Status: DISCONTINUED | OUTPATIENT
Start: 2024-12-09 | End: 2024-12-09

## 2024-12-09 RX ORDER — DEXTROSE MONOHYDRATE 25 G/50ML
50 INJECTION, SOLUTION INTRAVENOUS
Status: DISCONTINUED | OUTPATIENT
Start: 2024-12-09 | End: 2024-12-09

## 2024-12-09 RX ORDER — NALOXONE HYDROCHLORIDE 0.4 MG/ML
0.08 INJECTION, SOLUTION INTRAMUSCULAR; INTRAVENOUS; SUBCUTANEOUS ONCE AS NEEDED
Status: DISCONTINUED | OUTPATIENT
Start: 2024-12-09 | End: 2024-12-09

## 2024-12-09 RX ORDER — LIDOCAINE HYDROCHLORIDE 10 MG/ML
INJECTION, SOLUTION EPIDURAL; INFILTRATION; INTRACAUDAL; PERINEURAL AS NEEDED
Status: DISCONTINUED | OUTPATIENT
Start: 2024-12-09 | End: 2024-12-09 | Stop reason: SURG

## 2024-12-09 RX ORDER — NICOTINE POLACRILEX 4 MG
30 LOZENGE BUCCAL
Status: DISCONTINUED | OUTPATIENT
Start: 2024-12-09 | End: 2024-12-09

## 2024-12-09 RX ADMIN — LIDOCAINE HYDROCHLORIDE 150 MG: 10 INJECTION, SOLUTION EPIDURAL; INFILTRATION; INTRACAUDAL; PERINEURAL at 08:06:00

## 2024-12-09 NOTE — ANESTHESIA PREPROCEDURE EVALUATION
PRE-OP EVALUATION    Patient Name: Nacho Osorio    Admit Diagnosis: ROPER'S ESOPHAGUS WITHOUT DYSPLACIA, ROPER'S ESOPHAGUS WITH HIGH GRADE DYSPLASIA    Pre-op Diagnosis: ROPER'S ESOPHAGUS WITHOUT DYSPLACIA, ROPER'S ESOPHAGUS WITH HIGH GRADE DYSPLASIA    ESOPHAGOGASTRODUODENOSCOPY (EGD) RADIO FREQUENCY ABLATION (RFA) CHANNEL    Anesthesia Procedure: ESOPHAGOGASTRODUODENOSCOPY (EGD) RADIO FREQUENCY ABLATION (RFA) CHANNEL    Surgeons and Role:     * Manuel Adames MD - Primary    Pre-op vitals reviewed.  Temp: 97.6 °F (36.4 °C)  Pulse: 76  Resp: 16  BP: 150/75  SpO2: 95 %  Body mass index is 35.24 kg/m².    Current medications reviewed.  Hospital Medications:   glucose (Dex4) 15 GM/59ML oral liquid 15 g  15 g Oral Q15 Min PRN    Or    glucose (Glutose) 40% oral gel 15 g  15 g Oral Q15 Min PRN    Or    glucose-vitamin C (Dex-4) chewable tab 4 tablet  4 tablet Oral Q15 Min PRN    Or    dextrose 50% injection 50 mL  50 mL Intravenous Q15 Min PRN    Or    glucose (Dex4) 15 GM/59ML oral liquid 30 g  30 g Oral Q15 Min PRN    Or    glucose (Glutose) 40% oral gel 30 g  30 g Oral Q15 Min PRN    Or    glucose-vitamin C (Dex-4) chewable tab 8 tablet  8 tablet Oral Q15 Min PRN    lactated ringers infusion   Intravenous Continuous       Outpatient Medications:   Prescriptions Prior to Admission[1]    Allergies: Patient has no known allergies.      Anesthesia Evaluation    Patient summary reviewed.    Anesthetic Complications           GI/Hepatic/Renal      (+) GERD                           Cardiovascular                  (+) hypertension                                     Endo/Other      (+) diabetes                            Pulmonary                           Neuro/Psych                                      Past Surgical History:   Procedure Laterality Date    Colonoscopy  08/2015    limited prep. hemorrhoids. repeat 2018, 2 day prep    Colonoscopy  05/2018    polyp (non adenoma). repeat 5  yrs, 2 day  prep    Colonoscopy,biopsy N/A 08/18/2015    Procedure: COLONOSCOPY, POSSIBLE BIOPSY, POSSIBLE POLYPECTOMY 17164;  Surgeon: Kael Whelan MD;  Location: [facility]    Knee scope,med/lat menisectomy  03/20/2012    Procedure: ARTHROSCOPY KNEE;  Surgeon: Alexander Chapin MD;  Location: [facility], [facility]    Knee scope,remv loose body  03/20/2012    Procedure: ARTHROSCOPY KNEE;  Surgeon: Alexander Chapin MD;  Location: [facility], [facility]    Meniscectomy Right 04/2022    Other Right 03/07/2018    [facility] eye surgery     Other surgical history      nasal surgery.     Upper gi endoscopy,exam       Social History     Socioeconomic History    Marital status:    Tobacco Use    Smoking status: Never    Smokeless tobacco: Never   Vaping Use    Vaping status: Never Used   Substance and Sexual Activity    Alcohol use: Not Currently     Comment: occasional    Drug use: No     History   Drug Use No     Available pre-op labs reviewed.               Airway      Mallampati: II  Mouth opening: >3 FB  TM distance: > 6 cm  Neck ROM: full Cardiovascular    Cardiovascular exam normal.         Dental             Pulmonary    Pulmonary exam normal.                 Other findings              ASA: 2   Plan: MAC  NPO status verified and           Plan/risks discussed with: patient                Present on Admission:  **None**             [1]   Medications Prior to Admission   Medication Sig Dispense Refill Last Dose/Taking    semaglutide 4 MG/3ML Subcutaneous Solution Pen-injector Inject 1 mg into the skin once a week. Every Monday   Past Week    Ascorbic Acid (VITAMIN C) 1000 MG Oral Tab Take 1 tablet (1,000 mg total) by mouth daily.   Past Week    CINNAMON OR Take by mouth 2 (two) times a day.   12/8/2024 Morning    FARXIGA 10 MG Oral Tab TAKE 1 TABLET(10 MG) BY MOUTH DAILY 90 tablet 0 Past Month    OMEPRAZOLE 20 MG Oral Capsule Delayed Release TAKE ONE CAPSULE BY MOUTH DAILY AS NEEDED (Patient taking  differently: Take 2 capsules (40 mg total) by mouth every morning.) 90 capsule 0 Past Week    ATORVASTATIN 40 MG Oral Tab TAKE 1 TABLET(40 MG) BY MOUTH DAILY 90 tablet 1 Past Month    METFORMIN HCL 1000 MG Oral Tab TAKE 1 TABLET(1000 MG) BY MOUTH TWICE DAILY WITH MEALS 180 tablet 1 12/8/2024 Morning    glimepiride 2 MG Oral Tab Take 1 tablet (2 mg total) by mouth 2 (two) times a day. 180 tablet 1 12/8/2024 Morning    lisinopril 40 MG Oral Tab Take 1 tablet (40 mg total) by mouth once daily. 90 tablet 3 Past Month    ZINC OR Take by mouth daily as needed.   Past Week    omega-3 fatty acids (FISH OIL) 1000 MG Oral Cap Take 1,000 mg by mouth 2 (two) times daily.   Past Week    Glucose Blood (ONETOUCH ULTRA) In Vitro Strip  each 1     OneTouch Delica Lancets 33G Does not apply Misc Use to test 2x daily 300 each 1

## 2024-12-09 NOTE — DISCHARGE INSTRUCTIONS

## 2024-12-09 NOTE — OPERATIVE REPORT
OPERATIVE REPORT   PATIENT NAME: Nacho Osorio  MRN: [id number]  DATE OF OPERATION: 12/9/2024  PREOPERATIVE DIAGNOSIS: Sanders's esophagus with high grade dysplasia, Sanders's esophagus without dysplasia; s/p RFA and EMR in remote past and now with recurrent non-dysplastic Sanders's s/p RFA with me x 2; here for last ablation.  POSTOPERATIVE DIAGNOSIS:                1.  Irregular Z line and small 2cm hiatal hernia Hill type II   PROCEDURE PERFORMED: Esophagogastroduodenoscopy with radiofrequency ablation with channel RFA catheter  SEDATION MEDICATIONS: MAC  MODERATE SEDATION TIME: None.  Deep sedation provided by anesthesia  PREPROCEDURE ASSESSMENT: The indication for this procedure is to assess for radiofrequency ablation. The patient was identified by myself and nursing staff in the exam room. Informed consent was obtained. The patient was seen in clinic and a full H&P was obtained. On brief physical examination, airway is patent. Chest is clear. Heart has regular rate and rhythm. Abdomen is soft, nontender with good bowel sounds. A medication list was taken by nursing today and reviewed by myself. The patient is an ASA grade 2.   PROCEDURE NOTE: The procedure was completed without difficulty. The patient tolerated the procedure well. The endoscope was inserted through the mouth and advanced to the level of the duodenum, 3rd portion.  Esophagus appeared normal without stricture or esophagitis.  Irregular Z-line was noted. Circumferential ablation was performed using the channel RFA catheter.  This was done twice and in between the coagulum was scraped off using the attached cap. A small hiatal hernia was noted.  Stomach was normal in the antrum and the body.  Duodenum was normal in the bulb and 2nd duodenum.  Retroflexed view in the stomach revealed normal fundus and cardia. There were no immediate complications.   FINDINGS   Successful ablation #3/3 of Sanders's  RECOMMENDATIONS: Repeat EGD in 3 months at  ASC or office endoscopy lab.   DISCHARGE:  On discharge, the patient was given an after-visit summary detailing the procedure, findings, followup plans, and an updated medication list.      Thank you very much for the consultation.  I really appreciate it.     Manuel Adames MD

## 2025-03-11 NOTE — OPERATIVE REPORT
Exam as above  Encouraged annual exams with paps as indicated  Pt to F/U with PCP for all non-gyn health related issues    OPERATIVE REPORT   PATIENT NAME: Live Enamorado  MRN: [id number]  DATE OF OPERATION: 1/23/2023  PREOPERATIVE DIAGNOSIS:   Sanders's esophagus with high-grade dysplasia M3C0 status post radiofrequency ablation in November 2022. He is here for follow-up upper endoscopy and possible radiofrequency ablation     POSTOPERATIVE DIAGNOSES:  1. Residual short Sanders's, manifesting itself as 2 short tongues and an area of tiny islands  2. Sliding hiatal hernia  PROCEDURE PERFORMED: Upper endoscopy with endoscopic mucosal resection  SURGEON: Lynne Castillo MD   MEDICATIONS:  none    ANESTHESIA: MAC  CONSENT: Informed and obtained from the patient initially but during the procedure the consent was obtained from his wife to proceed with endoscopic mucosal resection. The risks of the procedure including but not limited to perforation, bleeding, infection, medication reaction, complication leading up to prolonged hospital stay or needing surgery were discussed. All questions were answered. SPECIMEN: Distal esophageal EMR x6 pieces  COMPLICATIONS: None immediately apparent  PROCEDURE AND FINDINGS:     After achieving adequate sedation and placing the patient in the left lateral decubitus position the Olympus upper scope was introduced under direct visualization in the esophagus after placement of the distal clear detachable cap into the tip of the scope. In the distal part of the esophagus at the level of the GE junction 2 tongues of columnar epithelium with the longest in the range of 1 cm was noted. No obvious suspicious areas seen. More proximal to it 3 tiny islands of columnar epithelium were seen. Sliding hiatal hernia was noted, Hill grade 2. Approximately 1 and half centimeter in length. The stomach semination was unremarkable. The bulb and the second portion of the duodenum appeared unremarkable.     Given the above appearance we felt that endoscopic mucosal resection would be a better choice for him to achieve complete eradication of the Sanders's. A consent was obtained from patient and wife. At this point the Olympus hard oblique cap was fitted onto the tip of the scope and the scope was introduced under direct visualization in the esophagus. The 2 tongues of columnar epithelium were resected after lifting the mucosa with saline submucosal injection followed by resection utilizing the Olympus crescent-shaped EMR snare. The area where the 3 tiny islands of columnar epithelium was also resected with the same technique. Few superficial oozing submucosal blood vessels were treated with cautery utilizing the 7 Dutch gold probe. Complete cessation of bleeding was achieved. A total of 6 pieces of tissue were resected and retrieved with a Nichols net basket after suctioning the gastric content. IMPRESSION:  1. Findings described above  RECOMMENDATIONS:  1. Clear liquid diet for today. Then soft tomorrow then as tolerated  2. Avoid NSAIDs for 7 days. 3. Continue with PPI twice daily.   4. Check follow biopsy results and most likely follow-up upper endoscopy in 2 months  Pradip Fofana MD

## (undated) DEVICE — Device: Brand: DEFENDO AIR/WATER/SUCTION AND BIOPSY VALVE

## (undated) DEVICE — CHANNEL RFA ENDOSCOPIC CATHETER,ESOPHAGEAL TTS, 120 APPLICATIONS: Brand: BARRX

## (undated) DEVICE — 10FT COMBINED O2 DELIVERY/CO2 MONITORING. FILTER WITH MICROSTREAM TYPE LUER: Brand: DUAL ADULT NASAL CANNULA

## (undated) DEVICE — 3M™ RED DOT™ MONITORING ELECTRODE WITH FOAM TAPE AND STICKY GEL, 50/BAG, 20/CASE, 72/PLT 2570: Brand: RED DOT™

## (undated) DEVICE — RFA CLEANING CAP: Brand: BARRX

## (undated) DEVICE — ENDOSCOPY PACK UPPER: Brand: MEDLINE INDUSTRIES, INC.

## (undated) DEVICE — MASK OXYGEN ADULT W/ C02 10FT

## (undated) DEVICE — FILTERLINE NASAL ADULT O2/CO2

## (undated) DEVICE — Device

## (undated) DEVICE — CAP ESCP 10.7-10.9MM MED HALO

## (undated) DEVICE — 1200CC GUARDIAN II: Brand: GUARDIAN

## (undated) DEVICE — TRAP SPEC REMOVAL ETRAP 15CM

## (undated) DEVICE — GLOVE,SURG,SENSICARE,ALOE,LF,PF,7: Brand: MEDLINE

## (undated) DEVICE — "SD-221L-25 DISP.CRESCENT SNARE-25MM": Brand: DISPOSABLE ELECTROSURGICAL SNARE

## (undated) DEVICE — 3M™ RED DOT™ MONITORING ELECTRODE WITH FOAM TAPE AND STICKY GEL 2570-3, 3/BAG, 200/CASE, 54/PLT: Brand: RED DOT™

## (undated) DEVICE — REM POLYHESIVE ADULT PATIENT RETURN ELECTRODE: Brand: VALLEYLAB

## (undated) DEVICE — CATHETER ABLATION HALO 90

## (undated) DEVICE — V2 SPECIMEN COLLECTION MANIFOLD KIT: Brand: NEPTUNE

## (undated) DEVICE — KIT CUSTOM ENDOPROCEDURE STERIS

## (undated) DEVICE — "MH-948 A/W CHANNEL CLEANING ADPTR -VIDEO": Brand: AW CHANNEL CLEANING ADAPTE

## (undated) DEVICE — KIT VLV 5 PC AIR H2O SUCT BX ENDOGATOR CONN

## (undated) DEVICE — NEEDLE CONTRAST INTERJECT 25G

## (undated) DEVICE — MEDI-VAC NON-CONDUCTIVE SUCTION TUBING: Brand: CARDINAL HEALTH

## (undated) DEVICE — DISPOSABLE EMR KIT: Brand: DISPOSABLE EMR KIT

## (undated) DEVICE — BITEBLOCK ENDOSCP 60FR MAXI STRP

## (undated) DEVICE — CATH GOLD PROBE HEMOGLIDE 7FR

## (undated) NOTE — LETTER
OSR/JARED Notification    Patient Name: Radha Beckham CSN: [id number]  -Age / Sex: 1965-A: 59 y  male Medical Records: [id number]    Surgeon(s):  Surgeon(s):  Raji Meredith MD   Procedure: ESOPHAGOGASTRODUODENOSCOPY (EGD) WITH POSSIBLE RADIO FREQUENCY ABLATION    Anesthesia Type: MAC Surgery Date: 23  During the pre-operative screening process using the STOP-Bang questionnaire, the patient listed above was identified as being at high risk for obstructive sleep apnea. If you feel it is appropriate to schedule a sleep study, the [facility] will give priority to patients scheduled for procedures to minimize surgical delays. If a sleep study is completed prior to surgery, please fax the results/plan of care to Miriam Vo ([phone]) as this information will be utilized in clinical decision-making regarding the patient's upcoming surgery. Thank you for your assistance in helping us provide a safe, seamless and personal experience for your patient.     Anali Hinds MD, PhD  [facility]iket Mcneil, Department of Anesthesia  Aniket Mcneil, Sleep Apnea Task Force

## (undated) NOTE — LETTER
OSR/JARED Notification    Patient Name: Deonte Jones CSN: [id number]  -Age / Sex: 1965-A: 59 y  male Medical Records: [id number]    Surgeon(s):  Surgeon(s):  Shayna Maldonado MD   Procedure: ESOPHAGOGASTRODUODENOSCOPY WITH RADIO FREQUENCY ABLATION AND POSSIBLE ENDOSCOPIC MUCOSAL RESECTION    Anesthesia Type: MAC Surgery Date: 10/17/2022    During the pre-operative screening process using the STOP-Bang questionnaire, the patient listed above was identified as being at high risk for obstructive sleep apnea. If you feel it is appropriate to schedule a sleep study, the [facility] will give priority to patients scheduled for procedures to minimize surgical delays. If a sleep study is completed prior to surgery, please fax the results/plan of care to Miriam Vo ([phone]) as this information will be utilized in clinical decision-making regarding the patient's upcoming surgery. Thank you for your assistance in helping us provide a safe, seamless and personal experience for your patient.     Marina Medel MD, PhD  [facility]ath[facility] Pacheco, Department of Anesthesia  Nathaly Pacheco, Sleep Apnea Task Force